# Patient Record
Sex: MALE | Race: WHITE | NOT HISPANIC OR LATINO | URBAN - METROPOLITAN AREA
[De-identification: names, ages, dates, MRNs, and addresses within clinical notes are randomized per-mention and may not be internally consistent; named-entity substitution may affect disease eponyms.]

---

## 2019-04-18 RX ORDER — TRIAMCINOLONE ACETONIDE 1 MG/G
CREAM TOPICAL 2 TIMES DAILY
COMMUNITY
End: 2020-11-19

## 2019-04-18 RX ORDER — DIMENHYDRINATE 50 MG
TABLET ORAL
COMMUNITY

## 2019-04-18 RX ORDER — CLOBETASOL PROPIONATE 0.5 MG/G
OINTMENT TOPICAL 2 TIMES DAILY
COMMUNITY
End: 2020-11-19

## 2019-04-18 RX ORDER — OMEGA-3/DHA/EPA/FISH OIL 60 MG-90MG
CAPSULE ORAL
COMMUNITY

## 2019-05-03 ENCOUNTER — OFFICE VISIT (OUTPATIENT)
Dept: NEUROSURGERY | Facility: CLINIC | Age: 54
End: 2019-05-03

## 2019-05-03 VITALS — HEIGHT: 64 IN | BODY MASS INDEX: 30.9 KG/M2 | TEMPERATURE: 98.9 F | WEIGHT: 181 LBS

## 2019-05-03 DIAGNOSIS — M51.36 DDD (DEGENERATIVE DISC DISEASE), LUMBAR: ICD-10-CM

## 2019-05-03 DIAGNOSIS — M54.59 MECHANICAL LOW BACK PAIN: Primary | ICD-10-CM

## 2019-05-03 DIAGNOSIS — M51.36 BULGING LUMBAR DISC: ICD-10-CM

## 2019-05-03 PROBLEM — M51.369 DDD (DEGENERATIVE DISC DISEASE), LUMBAR: Status: ACTIVE | Noted: 2019-05-03

## 2019-05-03 PROBLEM — M51.369 BULGING LUMBAR DISC: Status: ACTIVE | Noted: 2019-05-03

## 2019-05-03 PROCEDURE — 99243 OFF/OP CNSLTJ NEW/EST LOW 30: CPT | Performed by: NEUROLOGICAL SURGERY

## 2019-05-03 RX ORDER — METHOCARBAMOL 750 MG/1
750 TABLET, FILM COATED ORAL 3 TIMES DAILY
COMMUNITY
End: 2020-11-19

## 2019-05-03 NOTE — PROGRESS NOTES
Patient: Alberto Fernandze  : 1965    Primary Care Provider: Provider, No Known    Requesting Provider: As above        History    Chief Complaint: Chronic low back pain.    History of Present Illness: Mr. Fernandez is a 54-year-old  who first developed back pain when he injured his back in the [employer] in .  He had a severe flareup in  with sneezing.  His pain has essentially been ongoing.  He has no leg pain, weakness, numbness.  He has stiffness and difficulty moving about with some of the work that he does.  He has no bowel or bladder dysfunction.  In the past physical therapy was not helpful.  He has done some chiropractic.  He has never had injections.  He is better when not working.  Certain positions or lifting can be aggravating.    Review of Systems   Constitutional: Negative for activity change, appetite change, chills, diaphoresis, fatigue, fever and unexpected weight change.   HENT: Negative for congestion, dental problem, drooling, ear discharge, ear pain, facial swelling, hearing loss, mouth sores, nosebleeds, postnasal drip, rhinorrhea, sinus pressure, sneezing, sore throat, tinnitus, trouble swallowing and voice change.    Eyes: Negative for photophobia, pain, discharge, redness, itching and visual disturbance.   Respiratory: Negative for apnea, cough, choking, chest tightness, shortness of breath, wheezing and stridor.    Cardiovascular: Negative for chest pain, palpitations and leg swelling.   Gastrointestinal: Negative for abdominal distention, abdominal pain, anal bleeding, blood in stool, constipation, diarrhea, nausea, rectal pain and vomiting.   Endocrine: Negative for cold intolerance, heat intolerance, polydipsia, polyphagia and polyuria.   Genitourinary: Negative for decreased urine volume, difficulty urinating, dysuria, enuresis, flank pain, frequency, genital sores, hematuria and urgency.   Musculoskeletal: Positive for back pain. Negative for arthralgias, gait  "problem, joint swelling, myalgias, neck pain and neck stiffness.   Skin: Negative for color change, pallor, rash and wound.   Allergic/Immunologic: Negative for environmental allergies, food allergies and immunocompromised state.   Neurological: Negative for dizziness, tremors, seizures, syncope, facial asymmetry, speech difficulty, weakness, light-headedness, numbness and headaches.   Hematological: Negative for adenopathy. Does not bruise/bleed easily.   Psychiatric/Behavioral: Negative for agitation, behavioral problems, confusion, decreased concentration, dysphoric mood, hallucinations, self-injury, sleep disturbance and suicidal ideas. The patient is not nervous/anxious and is not hyperactive.        The patient's past medical history, past surgical history, family history, and social history have been reviewed at length in the electronic medical record.    Physical Exam:   Temp 98.9 °F (37.2 °C) (Temporal)   Ht 162.6 cm (64\")   Wt 82.1 kg (181 lb)   BMI 31.07 kg/m²   CONSTITUTIONAL: Patient is well-nourished, pleasant and appears stated age.  CV: Heart regular rate and rhythm without murmur, rub, or gallop.  PULMONARY: Lungs are clear to ascultation.  MUSCULOSKELETAL:  Straight leg raising is negative.  Kenny's Sign is negative.  ROM in back normal.  Tenderness in the back to palpation is not observed.  NEUROLOGICAL:  Orientation, memory, attention span, language function, and cognition have been examined and are intact.  Strength is intact in the lower extremities to direct testing.  Muscle tone is normal throughout.  Station and gait are normal.  Sensation is intact to light touch testing throughout.  Deep tendon reflexes are 1+ and symmetrical.  Coordination is intact.      Medical Decision Making    Data Review:   MRI of the lumbar spine dated 1/21/2019 demonstrates some diffuse degenerative disc disease.  There is also facet arthropathy.  There is disc protrusion into the recess on the right at L4-5 " and some similar findings more leftward at L5-S1.    Diagnosis:   The patient has mechanical back pain due to degenerative disc and degenerative joint disease.  He does not have an active radiculopathy despite having some disc protrusions.    Treatment Options:   There is no role for surgical intervention in this setting.  I have offered to refer him for some injections but he would like to wait on that.  He does not feel that his symptoms are severe enough at this point to warrant injections.  He will follow-up with me on an as-needed basis.       Diagnosis Plan   1. Mechanical low back pain     2. Bulging lumbar disc      L4/5, L5/S1   3. DDD (degenerative disc disease), lumbar         Scribed for Benji Rocha MD by Kendra Dorsey CMA. 5/3/2019 3:21 PM    I, Dr. Rocha, personally performed the services described in the documentation, as scribed in my presence, and it is both accurate and complete.

## 2020-11-19 ENCOUNTER — OFFICE VISIT (OUTPATIENT)
Dept: FAMILY MEDICINE CLINIC | Facility: CLINIC | Age: 55
End: 2020-11-19

## 2020-11-19 VITALS
WEIGHT: 188 LBS | OXYGEN SATURATION: 98 % | RESPIRATION RATE: 16 BRPM | TEMPERATURE: 99.3 F | SYSTOLIC BLOOD PRESSURE: 160 MMHG | HEIGHT: 64 IN | HEART RATE: 78 BPM | BODY MASS INDEX: 32.1 KG/M2 | DIASTOLIC BLOOD PRESSURE: 88 MMHG

## 2020-11-19 DIAGNOSIS — L30.9 ECZEMA OF BOTH HANDS: ICD-10-CM

## 2020-11-19 DIAGNOSIS — Z11.59 ENCOUNTER FOR HEPATITIS C SCREENING TEST FOR LOW RISK PATIENT: ICD-10-CM

## 2020-11-19 DIAGNOSIS — E78.00 HIGH CHOLESTEROL: ICD-10-CM

## 2020-11-19 DIAGNOSIS — Q55.61 PENILE CURVE: Primary | ICD-10-CM

## 2020-11-19 PROBLEM — I10 ESSENTIAL HYPERTENSION: Status: ACTIVE | Noted: 2020-11-19

## 2020-11-19 PROCEDURE — 99203 OFFICE O/P NEW LOW 30 MIN: CPT | Performed by: FAMILY MEDICINE

## 2020-11-19 NOTE — PROGRESS NOTES
Subjective   Alberto Fernandez is a 55 y.o. male.   Chief Complaint   Patient presents with   • Establish Care   • Discuss ED     penis is bent, sex uncomfortable     History of Present Illness   2 year ago, penis bent during intercourse. It occurred with penetration, it only hurt immediately after, but quickly resolved.   Since then, when penis is erect, it is bent. Seems to be getting progressively worse. He has no pain associated with this, but states that sexual intercourse is uncomfortable for his wife due to this.  No abnormality with flaccid penis, only when erect.     [facility]   They are seeing him for his back pain, diagnosed with muscle strain in 1986.     History of high cholesterol. Has been a few months since he updated labs.   Also, has hypertension, but monitors periodically at home and BP <140/90.     Hands are dry, worse during the winter months. He also is exposed to multiple chemicals at work, which also dries his hands. Requesting paraffin wax treatment to moisturize hands.     The following portions of the patient's history were reviewed and updated as appropriate: allergies, current medications, past family history, past medical history, past social history, past surgical history and problem list.    Review of Systems   Constitutional: Negative for activity change, appetite change and fever.   HENT: Negative.    Eyes: Negative.    Respiratory: Negative for cough and shortness of breath.    Cardiovascular: Negative for chest pain.   Genitourinary: Negative for discharge, penile pain and erectile dysfunction.        Penile curvature   Skin: Positive for dry skin (hands).   Neurological: Negative for light-headedness and headache.   Hematological: Negative for adenopathy.   Psychiatric/Behavioral: Negative.        Objective   Physical Exam  Vitals signs and nursing note reviewed.   Constitutional:       Appearance: He is well-developed.   HENT:      Head: Normocephalic and atraumatic.       Right Ear: Tympanic membrane, ear canal and external ear normal.      Left Ear: Tympanic membrane, ear canal and external ear normal.      Nose: Nose normal.   Eyes:      Conjunctiva/sclera: Conjunctivae normal.   Neck:      Musculoskeletal: Normal range of motion and neck supple.   Cardiovascular:      Rate and Rhythm: Normal rate and regular rhythm.      Heart sounds: Normal heart sounds.   Pulmonary:      Effort: Pulmonary effort is normal.      Breath sounds: Normal breath sounds. No wheezing.   Abdominal:      General: Bowel sounds are normal.      Palpations: Abdomen is soft.   Musculoskeletal:         General: No deformity.   Skin:     General: Skin is warm and dry.      Comments: Dry, flaky hands bilaterally.   Neurological:      Mental Status: He is alert and oriented to person, place, and time.      Cranial Nerves: No cranial nerve deficit.   Psychiatric:         Behavior: Behavior normal.           Assessment/Plan   Diagnoses and all orders for this visit:    1. Penile curve (Primary)  Comments:  Will refer to urology for further treatment  Orders:  -     Ambulatory Referral to Urology  -     CBC & Differential; Future  -     Comprehensive Metabolic Panel; Future  -     TSH+Free T4; Future    2. High cholesterol  Comments:  will update labs in near future  Orders:  -     Lipid Panel With / Chol / HDL Ratio; Future  -     TSH+Free T4; Future    3. Eczema of both hands  -     Emollient (WaxWel Paraffin Bath) kit; Apply 1 Units topically As Needed (dry hands). Use as directed to treat dry hands  Dispense: 1 kit; Refill: 0  -     Paraffin wax; Use as directed for treatment of dry hands  Dispense: 454 g; Refill: 3    4. Encounter for hepatitis C screening test for low risk patient  -     Hepatitis C Antibody; Future      BP is elevated, but he states that he monitors at home and while [facility], normal there.  Continue monitoring.

## 2020-11-19 NOTE — PATIENT INSTRUCTIONS
Go to the nearest ER or return to clinic if symptoms worsen, fever/chill develop    Hypertension, Adult  Hypertension is another name for high blood pressure. High blood pressure forces your heart to work harder to pump blood. This can cause problems over time.  There are two numbers in a blood pressure reading. There is a top number (systolic) over a bottom number (diastolic). It is best to have a blood pressure that is below 120/80. Healthy choices can help lower your blood pressure, or you may need medicine to help lower it.  What are the causes?  The cause of this condition is not known. Some conditions may be related to high blood pressure.  What increases the risk?  · Smoking.  · Having type 2 diabetes mellitus, high cholesterol, or both.  · Not getting enough exercise or physical activity.  · Being overweight.  · Having too much fat, sugar, calories, or salt (sodium) in your diet.  · Drinking too much alcohol.  · Having long-term (chronic) kidney disease.  · Having a family history of high blood pressure.  · Age. Risk increases with age.  · Race. You may be at higher risk if you are .  · Gender. Men are at higher risk than women before age 45. After age 65, women are at higher risk than men.  · Having obstructive sleep apnea.  · Stress.  What are the signs or symptoms?  · High blood pressure may not cause symptoms. Very high blood pressure (hypertensive crisis) may cause:  ? Headache.  ? Feelings of worry or nervousness (anxiety).  ? Shortness of breath.  ? Nosebleed.  ? A feeling of being sick to your stomach (nausea).  ? Throwing up (vomiting).  ? Changes in how you see.  ? Very bad chest pain.  ? Seizures.  How is this treated?  · This condition is treated by making healthy lifestyle changes, such as:  ? Eating healthy foods.  ? Exercising more.  ? Drinking less alcohol.  · Your health care provider may prescribe medicine if lifestyle changes are not enough to get your blood pressure under  control, and if:  ? Your top number is above 130.  ? Your bottom number is above 80.  · Your personal target blood pressure may vary.  Follow these instructions at home:  Eating and drinking    · If told, follow the DASH eating plan. To follow this plan:  ? Fill one half of your plate at each meal with fruits and vegetables.  ? Fill one fourth of your plate at each meal with whole grains. Whole grains include whole-wheat pasta, brown rice, and whole-grain bread.  ? Eat or drink low-fat dairy products, such as skim milk or low-fat yogurt.  ? Fill one fourth of your plate at each meal with low-fat (lean) proteins. Low-fat proteins include fish, chicken without skin, eggs, beans, and tofu.  ? Avoid fatty meat, cured and processed meat, or chicken with skin.  ? Avoid pre-made or processed food.  · Eat less than 1,500 mg of salt each day.  · Do not drink alcohol if:  ? Your doctor tells you not to drink.  ? You are pregnant, may be pregnant, or are planning to become pregnant.  · If you drink alcohol:  ? Limit how much you use to:  § 0-1 drink a day for women.  § 0-2 drinks a day for men.  ? Be aware of how much alcohol is in your drink. In the [place] one drink equals one 12 oz bottle of beer (355 mL), one 5 oz glass of wine (148 mL), or one 1½ oz glass of hard liquor (44 mL).  Lifestyle    · Work with your doctor to stay at a healthy weight or to lose weight. Ask your doctor what the best weight is for you.  · Get at least 30 minutes of exercise most days of the week. This may include walking, swimming, or biking.  · Get at least 30 minutes of exercise that strengthens your muscles (resistance exercise) at least 3 days a week. This may include lifting weights or doing Pilates.  · Do not use any products that contain nicotine or tobacco, such as cigarettes, e-cigarettes, and chewing tobacco. If you need help quitting, ask your doctor.  · Check your blood pressure at home as told by your doctor.  · Keep all follow-up  visits as told by your doctor. This is important.  Medicines  · Take over-the-counter and prescription medicines only as told by your doctor. Follow directions carefully.  · Do not skip doses of blood pressure medicine. The medicine does not work as well if you skip doses. Skipping doses also puts you at risk for problems.  · Ask your doctor about side effects or reactions to medicines that you should watch for.  Contact a doctor if you:  · Think you are having a reaction to the medicine you are taking.  · Have headaches that keep coming back (recurring).  · Feel dizzy.  · Have swelling in your ankles.  · Have trouble with your vision.  Get help right away if you:  · Get a very bad headache.  · Start to feel mixed up (confused).  · Feel weak or numb.  · Feel faint.  · Have very bad pain in your:  ? Chest.  ? Belly (abdomen).  · Throw up more than once.  · Have trouble breathing.  Summary  · Hypertension is another name for high blood pressure.  · High blood pressure forces your heart to work harder to pump blood.  · For most people, a normal blood pressure is less than 120/80.  · Making healthy choices can help lower blood pressure. If your blood pressure does not get lower with healthy choices, you may need to take medicine.  This information is not intended to replace advice given to you by your health care provider. Make sure you discuss any questions you have with your health care provider.  Document Released: 06/05/2009 Document Revised: 08/28/2019 Document Reviewed: 08/28/2019  [employer] Patient Education © 2020 [employer]

## 2020-12-19 ENCOUNTER — RESULTS ENCOUNTER (OUTPATIENT)
Dept: FAMILY MEDICINE CLINIC | Facility: CLINIC | Age: 55
End: 2020-12-19

## 2020-12-19 DIAGNOSIS — Q55.61 PENILE CURVE: ICD-10-CM

## 2020-12-19 DIAGNOSIS — E78.00 HIGH CHOLESTEROL: ICD-10-CM

## 2020-12-19 DIAGNOSIS — Z11.59 ENCOUNTER FOR HEPATITIS C SCREENING TEST FOR LOW RISK PATIENT: ICD-10-CM

## 2021-01-22 ENCOUNTER — LAB (OUTPATIENT)
Dept: FAMILY MEDICINE CLINIC | Facility: CLINIC | Age: 56
End: 2021-01-22

## 2021-01-23 LAB
ALBUMIN SERPL-MCNC: 4.5 G/DL (ref 3.5–5.2)
ALBUMIN/GLOB SERPL: 2.1 G/DL
ALP SERPL-CCNC: 77 U/L (ref 39–117)
ALT SERPL-CCNC: 53 U/L (ref 1–41)
AST SERPL-CCNC: 29 U/L (ref 1–40)
BASOPHILS # BLD AUTO: 0.04 10*3/MM3 (ref 0–0.2)
BASOPHILS NFR BLD AUTO: 0.6 % (ref 0–1.5)
BILIRUB SERPL-MCNC: 0.4 MG/DL (ref 0–1.2)
BUN SERPL-MCNC: 19 MG/DL (ref 6–20)
BUN/CREAT SERPL: 20 (ref 7–25)
CALCIUM SERPL-MCNC: 9.1 MG/DL (ref 8.6–10.5)
CHLORIDE SERPL-SCNC: 106 MMOL/L (ref 98–107)
CHOLEST SERPL-MCNC: 238 MG/DL (ref 0–200)
CHOLEST/HDLC SERPL: 6.26 {RATIO}
CO2 SERPL-SCNC: 27.5 MMOL/L (ref 22–29)
CREAT SERPL-MCNC: 0.95 MG/DL (ref 0.76–1.27)
EOSINOPHIL # BLD AUTO: 0.25 10*3/MM3 (ref 0–0.4)
EOSINOPHIL NFR BLD AUTO: 3.9 % (ref 0.3–6.2)
ERYTHROCYTE [DISTWIDTH] IN BLOOD BY AUTOMATED COUNT: 13.4 % (ref 12.3–15.4)
GLOBULIN SER CALC-MCNC: 2.1 GM/DL
GLUCOSE SERPL-MCNC: 94 MG/DL (ref 65–99)
HCT VFR BLD AUTO: 45.7 % (ref 37.5–51)
HCV AB S/CO SERPL IA: <0.1 S/CO RATIO (ref 0–0.9)
HDLC SERPL-MCNC: 38 MG/DL (ref 40–60)
HGB BLD-MCNC: 15.2 G/DL (ref 13–17.7)
IMM GRANULOCYTES # BLD AUTO: 0.02 10*3/MM3 (ref 0–0.05)
IMM GRANULOCYTES NFR BLD AUTO: 0.3 % (ref 0–0.5)
LDLC SERPL CALC-MCNC: 177 MG/DL (ref 0–100)
LYMPHOCYTES # BLD AUTO: 1.8 10*3/MM3 (ref 0.7–3.1)
LYMPHOCYTES NFR BLD AUTO: 28.4 % (ref 19.6–45.3)
MCH RBC QN AUTO: 28.8 PG (ref 26.6–33)
MCHC RBC AUTO-ENTMCNC: 33.3 G/DL (ref 31.5–35.7)
MCV RBC AUTO: 86.6 FL (ref 79–97)
MONOCYTES # BLD AUTO: 0.59 10*3/MM3 (ref 0.1–0.9)
MONOCYTES NFR BLD AUTO: 9.3 % (ref 5–12)
NEUTROPHILS # BLD AUTO: 3.63 10*3/MM3 (ref 1.7–7)
NEUTROPHILS NFR BLD AUTO: 57.5 % (ref 42.7–76)
NRBC BLD AUTO-RTO: 0 /100 WBC (ref 0–0.2)
PLATELET # BLD AUTO: 225 10*3/MM3 (ref 140–450)
POTASSIUM SERPL-SCNC: 5 MMOL/L (ref 3.5–5.2)
PROT SERPL-MCNC: 6.6 G/DL (ref 6–8.5)
RBC # BLD AUTO: 5.28 10*6/MM3 (ref 4.14–5.8)
SODIUM SERPL-SCNC: 142 MMOL/L (ref 136–145)
T4 FREE SERPL-MCNC: 1 NG/DL (ref 0.93–1.7)
TRIGL SERPL-MCNC: 128 MG/DL (ref 0–150)
TSH SERPL DL<=0.005 MIU/L-ACNC: 1.31 UIU/ML (ref 0.27–4.2)
VLDLC SERPL CALC-MCNC: 23 MG/DL (ref 5–40)
WBC # BLD AUTO: 6.33 10*3/MM3 (ref 3.4–10.8)

## 2021-05-18 ENCOUNTER — OFFICE VISIT (OUTPATIENT)
Dept: FAMILY MEDICINE CLINIC | Facility: CLINIC | Age: 56
End: 2021-05-18

## 2021-05-18 VITALS
DIASTOLIC BLOOD PRESSURE: 86 MMHG | HEART RATE: 82 BPM | BODY MASS INDEX: 32.1 KG/M2 | WEIGHT: 188 LBS | HEIGHT: 64 IN | SYSTOLIC BLOOD PRESSURE: 144 MMHG | RESPIRATION RATE: 18 BRPM | OXYGEN SATURATION: 99 % | TEMPERATURE: 98.2 F

## 2021-05-18 DIAGNOSIS — M54.50 CHRONIC MIDLINE LOW BACK PAIN WITHOUT SCIATICA: Primary | ICD-10-CM

## 2021-05-18 DIAGNOSIS — G89.29 CHRONIC MIDLINE LOW BACK PAIN WITHOUT SCIATICA: Primary | ICD-10-CM

## 2021-05-18 PROCEDURE — 99213 OFFICE O/P EST LOW 20 MIN: CPT | Performed by: NURSE PRACTITIONER

## 2021-05-18 RX ORDER — BACLOFEN 10 MG/1
10 TABLET ORAL 3 TIMES DAILY
Qty: 60 TABLET | Refills: 0 | Status: SHIPPED | OUTPATIENT
Start: 2021-05-18 | End: 2021-10-28

## 2021-05-18 NOTE — PROGRESS NOTES
"Chief Complaint  Lower back pain x 5d (Sharp and achy pain, constant )    Subjective          Alberto Fernandez presents to [facility]  History of Present Illness  Low back pain  Left sided, no sciatica or weakness or numbness in lower extremity  Lying and sitting and standing all makes pain worse. Certain movements make it very painful.   Rates pain 8/10 today.   Bulging disc on XR   Using heating pad helps but as soon as he stops the pain comes back like a spasm  Has been taking Ibuprofen and Tylenol OTC for pain.  Has done PT in the past  Injury to back many years ago when in       Objective   Vital Signs:   /86   Pulse 82   Temp 98.2 °F (36.8 °C)   Resp 18   Ht 162.6 cm (64\")   Wt 85.3 kg (188 lb)   SpO2 99%   BMI 32.27 kg/m²     Physical Exam  Vitals and nursing note reviewed.   Constitutional:       General: He is in acute distress.      Appearance: Normal appearance. He is well-developed.   HENT:      Head: Normocephalic.   Eyes:      General: Lids are normal.   Cardiovascular:      Rate and Rhythm: Normal rate and regular rhythm.      Heart sounds: Normal heart sounds, S1 normal and S2 normal.   Pulmonary:      Effort: Pulmonary effort is normal.      Breath sounds: Normal breath sounds.   Musculoskeletal:         General: Tenderness present. No deformity.      Cervical back: Normal range of motion and neck supple.      Lumbar back: Spasms, tenderness and bony tenderness present. Decreased range of motion.   Skin:     General: Skin is warm and dry.   Neurological:      Mental Status: He is alert and oriented to person, place, and time.      Sensory: No sensory deficit.      Motor: No abnormal muscle tone.      Deep Tendon Reflexes: Reflexes normal.      Comments: Difficulty getting up from seated position due to low back pain   Psychiatric:         Speech: Speech normal.         Behavior: Behavior normal.         Thought Content: Thought content normal.    "      Judgment: Judgment normal.        Result Review :                 Assessment and Plan    Diagnoses and all orders for this visit:    1. Chronic midline low back pain without sciatica (Primary)  -     baclofen (LIORESAL) 10 MG tablet; Take 1 tablet by mouth 3 (Three) Times a Day.  Dispense: 60 tablet; Refill: 0  -     Ambulatory Referral to Physical Therapy Evaluate and treat (low back pain)        Follow Up   Return if symptoms worsen or fail to improve.  Patient was given instructions and counseling regarding his condition or for health maintenance advice. Please see specific information pulled into the AVS if appropriate.   Will prescribe some muscle relaxants Baclofen 10 mg 3 times a day prn and have pt continue with OTC Ibuprofen 600-800 mg 3 times a day, Tylenol 500 mg every 8 hours.  Gentle stretching and heat/ice.  Referral to PT for evaluation and treatment given.  F/U if not improving, discussed doing topical pain reliever from [facility]. Pt will call back if not improving.

## 2021-09-17 DIAGNOSIS — Q55.61 PENILE CURVE: Primary | ICD-10-CM

## 2021-10-15 ENCOUNTER — OFFICE VISIT (OUTPATIENT)
Dept: FAMILY MEDICINE CLINIC | Facility: CLINIC | Age: 56
End: 2021-10-15

## 2021-10-15 VITALS
OXYGEN SATURATION: 99 % | TEMPERATURE: 98.2 F | HEART RATE: 70 BPM | DIASTOLIC BLOOD PRESSURE: 64 MMHG | HEIGHT: 64 IN | SYSTOLIC BLOOD PRESSURE: 118 MMHG | WEIGHT: 190.2 LBS | BODY MASS INDEX: 32.47 KG/M2

## 2021-10-15 DIAGNOSIS — R05.9 COUGH: ICD-10-CM

## 2021-10-15 DIAGNOSIS — J06.9 ACUTE URI: Primary | ICD-10-CM

## 2021-10-15 PROCEDURE — 99213 OFFICE O/P EST LOW 20 MIN: CPT | Performed by: FAMILY MEDICINE

## 2021-10-15 RX ORDER — AZITHROMYCIN 250 MG/1
TABLET, FILM COATED ORAL
Qty: 6 TABLET | Refills: 0 | Status: SHIPPED | OUTPATIENT
Start: 2021-10-15 | End: 2021-10-28

## 2021-10-15 RX ORDER — BROMPHENIRAMINE MALEATE, PSEUDOEPHEDRINE HYDROCHLORIDE, AND DEXTROMETHORPHAN HYDROBROMIDE 2; 30; 10 MG/5ML; MG/5ML; MG/5ML
5 SYRUP ORAL 4 TIMES DAILY PRN
Qty: 180 ML | Refills: 0 | Status: SHIPPED | OUTPATIENT
Start: 2021-10-15 | End: 2021-10-28 | Stop reason: SDUPTHER

## 2021-10-15 NOTE — PROGRESS NOTES
Subjective   Alberto Fernandez is a 56 y.o. male.     History of Present Illness     Cough times 2 weeks  Just no getting better  No fever   no SOA noted    Had COVID vaccine, has not had a COVID test          Review of Systems   Constitutional: Negative for fever.   HENT: Positive for congestion.    Respiratory: Positive for cough.        Objective   Physical Exam  Vitals and nursing note reviewed.   Constitutional:       Appearance: He is well-developed.   HENT:      Head: Normocephalic and atraumatic.      Right Ear: Hearing, tympanic membrane, ear canal and external ear normal.      Left Ear: Hearing, tympanic membrane, ear canal and external ear normal.      Nose: Nose normal.      Mouth/Throat:      Pharynx: Uvula midline.   Eyes:      Conjunctiva/sclera: Conjunctivae normal.   Cardiovascular:      Rate and Rhythm: Normal rate and regular rhythm.      Heart sounds: Normal heart sounds.   Pulmonary:      Effort: Pulmonary effort is normal.      Breath sounds: Normal breath sounds.   Musculoskeletal:      Cervical back: Normal range of motion.   Lymphadenopathy:      Cervical: No cervical adenopathy.   Psychiatric:         Behavior: Behavior normal.         Assessment/Plan   Diagnoses and all orders for this visit:    1. Acute URI (Primary)  -     azithromycin (Zithromax) 250 MG tablet; Take 2 tablets the first day, then 1 tablet daily for 4 days.  Dispense: 6 tablet; Refill: 0  -     brompheniramine-pseudoephedrine-DM 30-2-10 MG/5ML syrup; Take 5 mL by mouth 4 (Four) Times a Day As Needed for Congestion or Cough.  Dispense: 180 mL; Refill: 0    2. Cough  -     COVID-19,[facility] ROUTINE, NP/OP SWAB IN TRANSPORT MEDIA OR ESWAB 72 HR TAT - Swab, Nasopharynx    zpac, bromfed, COVID test sent off.  F/u pending results

## 2021-10-16 LAB
LABCORP SARS-COV-2, NAA 2 DAY TAT: NORMAL
SARS-COV-2 RNA RESP QL NAA+PROBE: NOT DETECTED

## 2021-10-28 ENCOUNTER — OFFICE VISIT (OUTPATIENT)
Dept: FAMILY MEDICINE CLINIC | Facility: CLINIC | Age: 56
End: 2021-10-28

## 2021-10-28 VITALS
DIASTOLIC BLOOD PRESSURE: 80 MMHG | HEIGHT: 64 IN | TEMPERATURE: 98 F | RESPIRATION RATE: 24 BRPM | SYSTOLIC BLOOD PRESSURE: 130 MMHG | WEIGHT: 187.6 LBS | OXYGEN SATURATION: 96 % | BODY MASS INDEX: 32.03 KG/M2 | HEART RATE: 74 BPM

## 2021-10-28 DIAGNOSIS — J06.9 ACUTE URI: ICD-10-CM

## 2021-10-28 PROCEDURE — 99213 OFFICE O/P EST LOW 20 MIN: CPT | Performed by: NURSE PRACTITIONER

## 2021-10-28 RX ORDER — LEVOCETIRIZINE DIHYDROCHLORIDE 5 MG/1
5 TABLET, FILM COATED ORAL EVERY EVENING
COMMUNITY

## 2021-10-28 RX ORDER — BROMPHENIRAMINE MALEATE, PSEUDOEPHEDRINE HYDROCHLORIDE, AND DEXTROMETHORPHAN HYDROBROMIDE 2; 30; 10 MG/5ML; MG/5ML; MG/5ML
5 SYRUP ORAL 4 TIMES DAILY PRN
Qty: 180 ML | Refills: 0 | Status: SHIPPED | OUTPATIENT
Start: 2021-10-28 | End: 2022-06-27

## 2021-10-28 RX ORDER — ALBUTEROL SULFATE 90 UG/1
2 AEROSOL, METERED RESPIRATORY (INHALATION) EVERY 4 HOURS PRN
Qty: 18 G | Refills: 0 | Status: SHIPPED | OUTPATIENT
Start: 2021-10-28

## 2021-10-28 RX ORDER — PREDNISONE 20 MG/1
TABLET ORAL
Qty: 10 TABLET | Refills: 0 | Status: SHIPPED | OUTPATIENT
Start: 2021-10-28

## 2021-10-28 NOTE — PROGRESS NOTES
"Chief Complaint  FU on URI / still coughing    Subjective          Alberto Fernandez presents to [facility]  History of Present Illness  Cough and congestion, tightness in chest, wheezing worse at night when lays down.  Was seen by Dr Adams on 10/15/21 and still has not recovered completely  No fever or chills, no congestion in nose, no loss of taste or smell, some runny nose  No hx of pneumonia or asthma  Clear sputum. Taking cough syrup Bromfed has helped some  No chest pain or difficulty breathing. No dizziness or night sweats.    Objective   Vital Signs:   /80   Pulse 74   Temp 98 °F (36.7 °C)   Resp 24   Ht 162.6 cm (64.02\")   Wt 85.1 kg (187 lb 9.6 oz)   SpO2 96%   BMI 32.19 kg/m²     Physical Exam  Constitutional:       General: He is not in acute distress.     Appearance: Normal appearance. He is not ill-appearing.   HENT:      Head: Normocephalic and atraumatic.      Right Ear: Tympanic membrane, ear canal and external ear normal.      Left Ear: Tympanic membrane, ear canal and external ear normal.   Cardiovascular:      Rate and Rhythm: Normal rate and regular rhythm.      Pulses: Normal pulses.      Heart sounds: Normal heart sounds.   Pulmonary:      Effort: Pulmonary effort is normal.      Breath sounds: Normal breath sounds. No wheezing, rhonchi or rales.   Lymphadenopathy:      Cervical: No cervical adenopathy.   Skin:     General: Skin is warm and dry.      Capillary Refill: Capillary refill takes less than 2 seconds.   Neurological:      General: No focal deficit present.      Mental Status: He is alert and oriented to person, place, and time.   Psychiatric:         Behavior: Behavior normal.         Thought Content: Thought content normal.         Judgment: Judgment normal.        Result Review :                 Assessment and Plan    Diagnoses and all orders for this visit:    1. Acute URI  -     brompheniramine-pseudoephedrine-DM 30-2-10 MG/5ML syrup; " Take 5 mL by mouth 4 (Four) Times a Day As Needed for Congestion or Cough.  Dispense: 180 mL; Refill: 0    Other orders  -     albuterol sulfate  (90 Base) MCG/ACT inhaler; Inhale 2 puffs Every 4 (Four) Hours As Needed for Wheezing.  Dispense: 18 g; Refill: 0  -     predniSONE (DELTASONE) 20 MG tablet; 1 tablet twice a day  Dispense: 10 tablet; Refill: 0        Follow Up   No follow-ups on file.  Patient was given instructions and counseling regarding his condition or for health maintenance advice. Please see specific information pulled into the AVS if appropriate.     Will treat with some steroids and inhaler and refill cough medication   F/U if not better will need to check XR of chest

## 2021-11-05 ENCOUNTER — OFFICE VISIT (OUTPATIENT)
Dept: UROLOGY | Facility: CLINIC | Age: 56
End: 2021-11-05

## 2021-11-05 VITALS
HEIGHT: 64 IN | BODY MASS INDEX: 31.92 KG/M2 | SYSTOLIC BLOOD PRESSURE: 138 MMHG | WEIGHT: 187 LBS | OXYGEN SATURATION: 96 % | HEART RATE: 104 BPM | DIASTOLIC BLOOD PRESSURE: 88 MMHG

## 2021-11-05 DIAGNOSIS — N48.6 PEYRONIE'S DISEASE: Primary | ICD-10-CM

## 2021-11-05 PROCEDURE — 99204 OFFICE O/P NEW MOD 45 MIN: CPT | Performed by: UROLOGY

## 2021-11-05 NOTE — PROGRESS NOTES
Office Visit New Urology      Patient Name: Alberto Fernandez  : 1965   MRN: [id number]     Chief Complaint:    Chief Complaint   Patient presents with   • Curved Penis       Referring Provider: Albina Worthy DO    History of Present Illness: Alberto Fernandez is a 56 y.o. male who presents to Urology today for evaluation of curvature with erection.  Patient has a past medical history significant for hypertension, low back pain, degenerative disc disease.  He reports an episode of penile trauma with penetration approximately 1 year ago.  He reports a buckling injury associated with immediate pain.  Denies detumescence at that time.  He reports no significant issues following or issues with erectile dysfunction.  He does report that over the past 6 to 9 months he has developed penile dorsal curvature.  He denies difficulty achieving or maintaining an erection.  He denies pain with erection.  He does report dorsal curvature that is resulting in pain during intercourse for his partner.  He denies that the curvature is significant enough to prevent penetration.  Otherwise he denies lower urinary tract symptoms.  Denies history of urinary tract infection, hematuria.  Denies prior urologic evaluation or instrumentation.      Subjective      Review of System: Review of Systems   Constitutional: Negative for chills, fatigue, fever and unexpected weight change.   HENT: Negative for sore throat.    Eyes: Negative for visual disturbance.   Respiratory: Negative for cough, chest tightness and shortness of breath.    Cardiovascular: Negative for chest pain and leg swelling.   Gastrointestinal: Negative for blood in stool, constipation, diarrhea, nausea, rectal pain and vomiting.   Genitourinary: Negative for decreased urine volume, difficulty urinating, dysuria, enuresis, flank pain, frequency, genital sores, hematuria and urgency.   Musculoskeletal: Negative for back pain and joint swelling.   Skin: Negative  for rash and wound.   Neurological: Negative for seizures, speech difficulty, weakness and headaches.   Psychiatric/Behavioral: Negative for confusion, sleep disturbance and suicidal ideas. The patient is not nervous/anxious.       I have reviewed the ROS documented by my clinical staff, updated appropriately and I agree. Cl Plunkett MD    Past Medical History:   Past Medical History:   Diagnosis Date   • Eczema    • Hyperlipidemia        Past Surgical History:   Past Surgical History:   Procedure Laterality Date   • HERNIA REPAIR     • VASECTOMY  October 2015       Family History:   Family History   Problem Relation Age of Onset   • Diabetes Mother    • Cancer Father    • Cancer Brother        Social History:   Social History     Socioeconomic History   • Marital status:    Tobacco Use   • Smoking status: Former Smoker     Packs/day: 0.25     Years: 15.00     Pack years: 3.75     Types: Cigarettes   • Smokeless tobacco: Never Used   Substance and Sexual Activity   • Alcohol use: Not Currently     Alcohol/week: 0.0 standard drinks   • Drug use: Never   • Sexual activity: Defer       Medications:     Current Outpatient Medications:   •  albuterol sulfate  (90 Base) MCG/ACT inhaler, Inhale 2 puffs Every 4 (Four) Hours As Needed for Wheezing., Disp: 18 g, Rfl: 0  •  brompheniramine-pseudoephedrine-DM 30-2-10 MG/5ML syrup, Take 5 mL by mouth 4 (Four) Times a Day As Needed for Congestion or Cough., Disp: 180 mL, Rfl: 0  •  Emollient (WaxWel Paraffin Bath) kit, Apply 1 Units topically As Needed (dry hands). Use as directed to treat dry hands, Disp: 1 kit, Rfl: 0  •  Flaxseed, Linseed, (FLAX SEED OIL) 1000 MG capsule, Take  by mouth., Disp: , Rfl:   •  levocetirizine (XYZAL) 5 MG tablet, Take 5 mg by mouth Every Evening., Disp: , Rfl:   •  Omega-3 Fatty Acids (FISH OIL) 500 MG capsule capsule, Take  by mouth Daily With Breakfast., Disp: , Rfl:   •  Paraffin wax, Use as directed for treatment of dry hands,  Disp: 454 g, Rfl: 3  •  predniSONE (DELTASONE) 20 MG tablet, 1 tablet twice a day, Disp: 10 tablet, Rfl: 0    Allergies:   Allergies   Allergen Reactions   • Prednisolone Rash       IPSS Questionnaire (AUA-7):  Over the past month…    1)  Incomplete Emptying  How often have you had a sensation of not emptying your bladder?  0 - Not at all   2)  Frequency  How often have you had to urinate less than every two hours? 1 - Less than 1 time in 5   3)  Intermittency  How often have you found you stopped and started again several times when you urinated?  0 - Not at all   4) Urgency  How often have you found it difficult to postpone urination?  0 - Not at all   5) Weak Stream  How often have you had a weak urinary stream?  0 - Not at all   6) Straining  How often have you had to push or strain to begin urination?  0 - Not at all   7) Nocturia  How many times did you typically get up at night to urinate?  1 - 1 time   Total Score:  2       Quality of life due to urinary symptoms:  If you were to spend the rest of your life with your urinary condition the way it is now, how would you feel about that? 1-Pleased   Urine Leakage (Incontinence) 0-No Leakage     Sexual Health Inventory for Men (NICO)   Over the past 6 months:     1. How do you rate your confidence that you could get and keep an erection?  4 - High    2. When you had erections with sexual                                     stimulation, how often were your erections hard enough for penetration (entering your partner)?  4 - Most times ( much more than, half the time)   3. During sexual intercourse, how often were you able to maintain your erection after you had penetrated (entered) your partner?  5 - Almost always or always    4. During sexual intercourse, how difficult was it to maintain your erection to completion of intercourse?  5 - Not difficult    5. When you attempted sexual intercourse, how often was it satisfactory for you?  5 - Almost always or always      "Total Score: 23   The Sexual Health Inventory for Men further classifies ED severity with the following breakpoints:   1-7 (Severe ED) 8-11 (Moderate ED) 12-16 (Mild to Moderate ED) 17-21 (Mild ED)            Objective     Physical Exam:   Vital Signs:   Vitals:    11/05/21 1106   BP: 138/88   Pulse: 104   SpO2: 96%   Weight: 84.8 kg (187 lb)   Height: 162.6 cm (64.02\")   PainSc: 0-No pain     Body mass index is 32.08 kg/m².     Physical Exam  Vitals and nursing note reviewed.   Constitutional:       Appearance: Normal appearance. He is normal weight.   Cardiovascular:      Comments: Well-perfused  Pulmonary:      Effort: Pulmonary effort is normal.   Abdominal:      General: Abdomen is flat.      Palpations: Abdomen is soft.   Musculoskeletal:         General: Normal range of motion.   Skin:     General: Skin is warm and dry.   Neurological:      General: No focal deficit present.      Mental Status: He is alert and oriented to person, place, and time. Mental status is at baseline.   Psychiatric:         Mood and Affect: Mood normal.         Behavior: Behavior normal.         Thought Content: Thought content normal.         Judgment: Judgment normal.         Genitourinary  Penis: circumcised penis, orthotopic meatus, glans normal, no penile discharge.  No rashes/lesions.  Palpable Peyronie's plaque proximal one third of the penile shaft located dorsally.  Nontender to palpation  Testes: descended bilaterally, no masses, nontender to palpation. Remainder of scrotal contents normal. No hernia appreciated.    Labs:   Brief Urine Lab Results     None               Lab Results   Component Value Date    GLUCOSE 94 01/22/2021    CALCIUM 9.1 01/22/2021     01/22/2021    K 5.0 01/22/2021    CO2 27.5 01/22/2021     01/22/2021    BUN 19 01/22/2021    CREATININE 0.95 01/22/2021    EGFRIFAFRI 99 01/22/2021    EGFRIFNONA 82 01/22/2021    BCR 20.0 01/22/2021       Lab Results   Component Value Date    WBC 6.33 " 01/22/2021    HGB 15.2 01/22/2021    HCT 45.7 01/22/2021    MCV 86.6 01/22/2021     01/22/2021     I personally viewed the patient's recent laboratory values including a creatinine of 0.9 in 1021    Images:   No Images in the past 120 days found..    Measures:   Tobacco:   Alberto Fernandez  reports that he has quit smoking. His smoking use included cigarettes. He has a 3.75 pack-year smoking history. He has never used smokeless tobacco.. I have educated him on the risk of diseases from using tobacco products.    Assessment / Plan      Assessment/Plan:   56 y.o. male is here today for for Peyronie's disease.  He reports an episode of penile trauma during intercourse approximately 1 year ago.  Reports onset of curvature over the past 6 to 9 months with dorsal curvature that is not preventing intercourse at this time.  He reports his partner is having some pain during intercourse which resulted him presenting for evaluation.  He denies difficulty achieving or maintaining erection, denies pain with erection or ejaculation.  Denies lower urinary tract symptoms.    Diagnoses and all orders for this visit:    1. Peyronie's disease (Primary)       Patient education:  Today we discussed the etiology and management of Peyronie's disease.  We discussed work-up including history and physical exam.  We discussed this is a process in which scar tissue or results after episode of penile trauma or repetitive microinjury.  We discussed curvature as a result of scar tissue which forms in the tunica albuginea of the corpora cavernosa.  We discussed that scar tissue does not allow for lengthening an erection or result in curvature with erection.  We discussed there is often an acute phase which is resulted with pain which can be treated with NSAIDs in a chronic phase in which there is plaque stabilization.  He is at the point of chronic phase with stabilization and denies change in his curvature recently.  He feels his curvature  is around 45 degrees, we do not have pictures to evaluate today.  We discussed conservative and surgical strategies for repair.  We discussed that often intervention is reserved for patients that have curvature significant enough to prevent penetration during intercourse.  We discussed strategies including Xiaflex, plication, decision and grafting.  We discussed the risks and benefits of each of these strategies.  At this time the patient reports his curvature is not significant enough to prevent intercourse and he does not desire intervention at this time.  He will contact us if he has worsening or changes or desires further follow-up.    Follow Up:   No follow-ups on file.    I spent 45 minutes providing clinical care for this patient; including review of patient's chart and provider documentation, face to face time spent with patient in examination room (obtaining history, performing physical exam, discussing diagnosis and management options), placing orders, and completing patient documentation.     Cl Plunkett MD  [facility] [place]

## 2021-11-19 ENCOUNTER — FLU SHOT (OUTPATIENT)
Dept: FAMILY MEDICINE CLINIC | Facility: CLINIC | Age: 56
End: 2021-11-19

## 2021-11-19 DIAGNOSIS — Z23 NEED FOR INFLUENZA VACCINATION: Primary | ICD-10-CM

## 2021-11-19 PROCEDURE — 90471 IMMUNIZATION ADMIN: CPT | Performed by: NURSE PRACTITIONER

## 2021-11-19 PROCEDURE — 90686 IIV4 VACC NO PRSV 0.5 ML IM: CPT | Performed by: NURSE PRACTITIONER

## 2022-01-04 ENCOUNTER — TELEPHONE (OUTPATIENT)
Dept: FAMILY MEDICINE CLINIC | Facility: CLINIC | Age: 57
End: 2022-01-04

## 2022-01-04 NOTE — TELEPHONE ENCOUNTER
PATIENT GOT COVID BOOSTER YESTERDAY.  NOW HAVING HEADACHE.  WHAT TO TAKE?    PLEASE CALL [phone]

## 2022-06-27 ENCOUNTER — TELEPHONE (OUTPATIENT)
Dept: FAMILY MEDICINE CLINIC | Facility: CLINIC | Age: 57
End: 2022-06-27

## 2022-06-27 ENCOUNTER — OFFICE VISIT (OUTPATIENT)
Dept: FAMILY MEDICINE CLINIC | Facility: CLINIC | Age: 57
End: 2022-06-27

## 2022-06-27 VITALS — BODY MASS INDEX: 30.9 KG/M2 | TEMPERATURE: 98.4 F | WEIGHT: 181 LBS | RESPIRATION RATE: 18 BRPM | HEIGHT: 64 IN

## 2022-06-27 DIAGNOSIS — U07.1 COVID-19 VIRUS DETECTED: Primary | ICD-10-CM

## 2022-06-27 LAB
EXPIRATION DATE: NORMAL
FLUAV AG UPPER RESP QL IA.RAPID: NOT DETECTED
FLUBV AG UPPER RESP QL IA.RAPID: NOT DETECTED
INTERNAL CONTROL: NORMAL
Lab: NORMAL
SARS-COV-2 AG UPPER RESP QL IA.RAPID: NOT DETECTED

## 2022-06-27 PROCEDURE — 99213 OFFICE O/P EST LOW 20 MIN: CPT | Performed by: FAMILY MEDICINE

## 2022-06-27 PROCEDURE — 87428 SARSCOV & INF VIR A&B AG IA: CPT | Performed by: FAMILY MEDICINE

## 2022-06-27 NOTE — PROGRESS NOTES
Subjective   Alberto Fernandez is a 57 y.o. male.     History of Present Illness     Pt has felt ill the past 2-3 days with fatigue and malaise  He had positive home test for COVID  He work will not take at home test    He has had sweats, minor throat sore and body aches  No fever        Review of Systems   Constitutional: Positive for chills and fatigue.       Objective   Physical Exam  Vitals and nursing note reviewed.   Constitutional:       General: He is not in acute distress.     Appearance: Normal appearance. He is well-developed.   Cardiovascular:      Rate and Rhythm: Normal rate and regular rhythm.      Heart sounds: Normal heart sounds.   Pulmonary:      Effort: Pulmonary effort is normal.      Breath sounds: Normal breath sounds.   Neurological:      Mental Status: He is alert and oriented to person, place, and time.   Psychiatric:         Mood and Affect: Mood normal.         Behavior: Behavior normal.         Thought Content: Thought content normal.         Judgment: Judgment normal.         Assessment & Plan   Diagnoses and all orders for this visit:    1. COVID-19 virus detected (Primary)  -     POCT SARS-CoV-2 Antigen MATTEO + Flu    with positive home COVID test and symptoms, I would recommend he quarantine for 5 days and treat him as COVID positive.  Work note given  Fluids, rest, ibuprofen.  He will call with any further issues

## 2022-06-27 NOTE — TELEPHONE ENCOUNTER
Caller: Alberto Fernandez    Relationship: Self    Best call back number: [phone]    What orders are you requesting (i.e. lab or imaging): COVID PCR TEST    In what timeframe would the patient need to come in: ASAP    Where will you receive your lab/imaging services:IN OFFICE    Additional notes: PATIENT STATES THAT HE NEEDS A LETTER FOR HIS JOB STATING THAT HE TESTED POSITIVE FOR COVID. PATIENT STATES THAT HE TESTED POS FOR COVID ON 06/25/22 WITH AN AT HOME TEST.

## 2022-06-28 ENCOUNTER — TELEPHONE (OUTPATIENT)
Dept: FAMILY MEDICINE CLINIC | Facility: CLINIC | Age: 57
End: 2022-06-28

## 2022-06-28 NOTE — TELEPHONE ENCOUNTER
Caller: Alberto Fernandez    Relationship: Self    Best call back number: [phone]    Caller requesting test results: YES    What test was performed: COVID    When was the test performed: 06/27/22    Where was the test performed: AT OFFICE    Additional notes:

## 2022-06-29 ENCOUNTER — TELEPHONE (OUTPATIENT)
Dept: FAMILY MEDICINE CLINIC | Facility: CLINIC | Age: 57
End: 2022-06-29

## 2022-06-29 NOTE — TELEPHONE ENCOUNTER
I would treat him like he had + COVID based on his one positive test.  I would recommend he NOT return to work until 7/5/22, next week

## 2022-06-29 NOTE — TELEPHONE ENCOUNTER
Caller: Alberto Fernandez     Relationship: Self     Best call back number: [phone]     Caller requesting test results: YES     What test was performed: COVID     When was the test performed: 06/27/22     Where was the test performed: AT OFFICE     Additional notes:  CONCERNED BECAUSE THE HOME TEST WAS POSITIVE AND THE IN OFFICE RAPID WAS NEGATIVE, AND HE WANTS TO MAKE SURE IS OKAY TO RETURN TO WORK WITHOUT RESTRICTION.    PLEASE CALL AND ADVISE

## 2022-06-29 NOTE — TELEPHONE ENCOUNTER
Called informed pt. Patient will need a work note stating due to + covid and return date. Faxed to number below.     Fax [phone]  Include the following.   [id number]  Claim # [id number]

## 2022-07-14 NOTE — TELEPHONE ENCOUNTER
PATIENT CALLED ABOUT GETTING THE WORK EXCUSE TO BE FAXED OVER TO THE WORK COMPANY.  Fax [phone]

## 2022-10-05 ENCOUNTER — FLU SHOT (OUTPATIENT)
Dept: FAMILY MEDICINE CLINIC | Facility: CLINIC | Age: 57
End: 2022-10-05

## 2022-10-05 DIAGNOSIS — Z23 NEED FOR INFLUENZA VACCINATION: Primary | ICD-10-CM

## 2022-10-05 PROCEDURE — 90686 IIV4 VACC NO PRSV 0.5 ML IM: CPT | Performed by: FAMILY MEDICINE

## 2022-10-05 PROCEDURE — 90471 IMMUNIZATION ADMIN: CPT | Performed by: FAMILY MEDICINE

## 2022-10-19 ENCOUNTER — TELEPHONE (OUTPATIENT)
Dept: FAMILY MEDICINE CLINIC | Facility: CLINIC | Age: 57
End: 2022-10-19

## 2022-10-19 NOTE — TELEPHONE ENCOUNTER
Patient called has body aches, congestion, chills, coughing up phlem, hot/cold, no fever did a home test for covid it was negative.  Wife tested positive for Flu on Tuesday, we have NO same day appts.  Could we call in Tamiflu and a cough medication.  Please advise and let pt know?  Thank you.

## 2022-10-20 NOTE — TELEPHONE ENCOUNTER
When did his symptoms start?  He is on schedule tomorrow for same day appt. I haven't seen him myself since November 2020.

## 2025-02-21 ENCOUNTER — TELEPHONE (OUTPATIENT)
Dept: FAMILY MEDICINE CLINIC | Facility: CLINIC | Age: 60
End: 2025-02-21
Payer: OTHER GOVERNMENT

## 2025-02-21 NOTE — TELEPHONE ENCOUNTER
Caller: ENA BUTLER    Relationship to patient: Emergency Contact    Best call back number: [phone]     Chief complaint: STOMACH BLOAT, LAB WORK     Type of visit: PHYSICAL    Additional notes:PATIENT HAS BEEN SCHEDULED FOR PHYSICAL WITH ANOTHER PCP DUE TO HIS SCHEDULING NEEDS.

## 2025-02-28 ENCOUNTER — HOSPITAL ENCOUNTER (OUTPATIENT)
Dept: GENERAL RADIOLOGY | Facility: HOSPITAL | Age: 60
Discharge: HOME | End: 2025-02-28
Payer: OTHER GOVERNMENT

## 2025-02-28 ENCOUNTER — OFFICE VISIT (OUTPATIENT)
Dept: FAMILY MEDICINE CLINIC | Facility: CLINIC | Age: 60
End: 2025-02-28
Payer: OTHER GOVERNMENT

## 2025-02-28 VITALS
SYSTOLIC BLOOD PRESSURE: 134 MMHG | HEIGHT: 64 IN | HEART RATE: 66 BPM | OXYGEN SATURATION: 98 % | DIASTOLIC BLOOD PRESSURE: 78 MMHG | RESPIRATION RATE: 18 BRPM | WEIGHT: 185 LBS | BODY MASS INDEX: 31.58 KG/M2

## 2025-02-28 DIAGNOSIS — E03.9 HYPOTHYROIDISM, UNSPECIFIED TYPE: ICD-10-CM

## 2025-02-28 DIAGNOSIS — M79.675 TOE PAIN, LEFT: ICD-10-CM

## 2025-02-28 DIAGNOSIS — Z12.5 ENCOUNTER FOR SCREENING PROSTATE SPECIFIC ANTIGEN (PSA) MEASUREMENT: ICD-10-CM

## 2025-02-28 DIAGNOSIS — Z00.00 ENCOUNTER FOR WELL ADULT EXAM WITHOUT ABNORMAL FINDINGS: Primary | ICD-10-CM

## 2025-02-28 DIAGNOSIS — E73.9 LACTOSE INTOLERANCE: ICD-10-CM

## 2025-02-28 PROBLEM — G47.33 OSA (OBSTRUCTIVE SLEEP APNEA): Status: ACTIVE | Noted: 2025-02-28

## 2025-02-28 PROCEDURE — 73660 X-RAY EXAM OF TOE(S): CPT

## 2025-02-28 NOTE — ASSESSMENT & PLAN NOTE
Will complete blood work. Discussed the importance of a healthy, well-balanced diet and active lifestyle. Return in 1 year for annual physical.

## 2025-02-28 NOTE — ASSESSMENT & PLAN NOTE
Advised patient to minimalize/cut out all dairy in his diet. If symptoms fail to improve with diet changes he should return for further workup and evaluation.

## 2025-02-28 NOTE — PROGRESS NOTES
Office Note     Name: Alberto Fernandez    : 1965     MRN: [id number]     Chief Complaint  Annual Exam, Bloated (Pt states off and on for a while), and Toe Injury (Pt states left foot middle tow is possible broke, toe made contact with the couch while walking)    Subjective     History of Present Illness:  Alberto Fernandez is a 60 y.o. male who presents today for  an annual visit.     Has no significant PMH.    Complains of bloating over the last 6 months. Admits that bloating most often occurs after he consumes dairy. Denies diarrhea and gas. Has noticed that symptoms have improved since he switched to almond milk.    Also complains of left toe pain. Stubbed his 3rd L toe approximately 1 week ago and has had bruising, pain, and swelling since. Pain is worse with walking. Has tried using ice which has improved his swelling and pain. Denies taking any OTC medications.     Admits to well-balanced diet but says that he has room for improvement. Diet includes both fruits and vegetables. Drinks some water but not enough. Sleeps well for the most part. Wears a C-PAP for CLEMENT.     Has no other complains or concerns.     Past Medical History:   Past Medical History:   Diagnosis Date    Eczema     Hyperlipidemia        Past Surgical History:   Past Surgical History:   Procedure Laterality Date    HERNIA REPAIR      VASECTOMY  2015       Immunizations:   Immunization History   Administered Date(s) Administered    COVID-19 (PFIZER) Purple Cap Monovalent 2021, 2021, 2022    Flu Vaccine Quad PF >36MO 10/23/2015, 2016, 2017, 10/29/2018, 10/14/2019, 10/14/2020    Fluzone (or Fluarix & Flulaval for VFC) >6mos 10/14/2020, 2021, 10/05/2022    Hepatitis A 2018, 2019    Influenza TIV (IM) 2013, 10/11/2013, 10/17/2014        Medications:     Current Outpatient Medications:     albuterol sulfate  (90 Base) MCG/ACT inhaler, Inhale 2 puffs Every 4 (Four) Hours As  "Needed for Wheezing., Disp: 18 g, Rfl: 0    Flaxseed, Linseed, (FLAX SEED OIL) 1000 MG capsule, Take  by mouth., Disp: , Rfl:     levocetirizine (XYZAL) 5 MG tablet, Take 1 tablet by mouth Every Evening., Disp: , Rfl:     Omega-3 Fatty Acids (FISH OIL) 500 MG capsule capsule, Take  by mouth Daily With Breakfast., Disp: , Rfl:     Paraffin wax, Use as directed for treatment of dry hands, Disp: 454 g, Rfl: 3    predniSONE (DELTASONE) 20 MG tablet, 1 tablet twice a day, Disp: 10 tablet, Rfl: 0    Allergies:   Allergies   Allergen Reactions    Prednisolone Rash       Family History:   Family History   Problem Relation Age of Onset    Diabetes Mother     Cancer Father     Cancer Brother        Social History:   Social History     Socioeconomic History    Marital status:    Tobacco Use    Smoking status: Former     Current packs/day: 0.25     Average packs/day: 0.3 packs/day for 15.0 years (3.8 ttl pk-yrs)     Types: Cigarettes    Smokeless tobacco: Never   Substance and Sexual Activity    Alcohol use: Not Currently     Alcohol/week: 0.0 standard drinks of alcohol    Drug use: Never    Sexual activity: Defer       Objective     Vital Signs  /78 (BP Location: Left arm, Patient Position: Sitting, Cuff Size: Adult)   Pulse 66   Resp 18   Ht 162.6 cm (64\")   Wt 83.9 kg (185 lb)   SpO2 98%   BMI 31.76 kg/m²   Estimated body mass index is 31.76 kg/m² as calculated from the following:    Height as of this encounter: 162.6 cm (64\").    Weight as of this encounter: 83.9 kg (185 lb).    BMI is >= 30 and <35. (Class 1 Obesity). The following options were offered after discussion;: exercise counseling/recommendations and nutrition counseling/recommendations    Physical Exam  Vitals and nursing note reviewed.   Constitutional:       Appearance: Normal appearance.   HENT:      Head: Normocephalic and atraumatic.   Cardiovascular:      Rate and Rhythm: Normal rate and regular rhythm.      Heart sounds: No murmur " heard.     No friction rub. No gallop.   Pulmonary:      Effort: Pulmonary effort is normal.      Breath sounds: Normal breath sounds. No wheezing, rhonchi or rales.   Musculoskeletal:      Right foot: Swelling, tenderness and bony tenderness present.        Legs:    Skin:     General: Skin is warm and dry.   Neurological:      General: No focal deficit present.      Mental Status: He is alert and oriented to person, place, and time.   Psychiatric:         Mood and Affect: Mood normal.         Behavior: Behavior normal.        Assessment and Plan     Diagnoses and all orders for this visit:    1. Encounter for well adult exam without abnormal findings (Primary)  Assessment & Plan:  Will complete blood work. Discussed the importance of a healthy, well-balanced diet and active lifestyle. Return in 1 year for annual physical.     Orders:  -     CBC & Differential  -     TSH  -     Comprehensive Metabolic Panel  -     Lipid Panel    2. Toe pain, left  Assessment & Plan:  Have ordered x-ray. Advised patient to continue applying ice to the affected area and taking OTC pain relievers as needed for symptomatic relief. Return if symptoms worsen or fail to improve.     Orders:  -     XR Toe 2+ View Left (In Office); Future    3. Lactose intolerance  Assessment & Plan:  Advised patient to minimalize/cut out all dairy in his diet. If symptoms fail to improve with diet changes he should return for further workup and evaluation.       4. Encounter for screening prostate specific antigen (PSA) measurement  -     PSA SCREENING    5. Hypothyroidism, unspecified type  -     TSH       Follow Up  Return in about 1 year (around 2/28/2026).    SURI Rodriguez CO  [facility]  [address] [address] [phone]

## 2025-02-28 NOTE — ASSESSMENT & PLAN NOTE
Have ordered x-ray. Advised patient to continue applying ice to the affected area and taking OTC pain relievers as needed for symptomatic relief. Return if symptoms worsen or fail to improve.

## 2025-03-01 LAB
ALBUMIN SERPL-MCNC: 4.6 G/DL (ref 3.9–4.9)
ALP SERPL-CCNC: 97 IU/L (ref 44–121)
ALT SERPL-CCNC: 94 IU/L (ref 0–44)
AST SERPL-CCNC: 38 IU/L (ref 0–40)
BASOPHILS # BLD AUTO: 0 X10E3/UL (ref 0–0.2)
BASOPHILS NFR BLD AUTO: 1 %
BILIRUB SERPL-MCNC: 0.5 MG/DL (ref 0–1.2)
BUN SERPL-MCNC: 14 MG/DL (ref 8–27)
BUN/CREAT SERPL: 14 (ref 10–24)
CALCIUM SERPL-MCNC: 9.9 MG/DL (ref 8.6–10.2)
CHLORIDE SERPL-SCNC: 103 MMOL/L (ref 96–106)
CHOLEST SERPL-MCNC: 275 MG/DL (ref 100–199)
CO2 SERPL-SCNC: 25 MMOL/L (ref 20–29)
CREAT SERPL-MCNC: 0.98 MG/DL (ref 0.76–1.27)
EGFRCR SERPLBLD CKD-EPI 2021: 88 ML/MIN/1.73
EOSINOPHIL # BLD AUTO: 0.3 X10E3/UL (ref 0–0.4)
EOSINOPHIL NFR BLD AUTO: 4 %
ERYTHROCYTE [DISTWIDTH] IN BLOOD BY AUTOMATED COUNT: 13.7 % (ref 11.6–15.4)
GLOBULIN SER CALC-MCNC: 2.1 G/DL (ref 1.5–4.5)
GLUCOSE SERPL-MCNC: 100 MG/DL (ref 70–99)
HCT VFR BLD AUTO: 45.9 % (ref 37.5–51)
HDLC SERPL-MCNC: 32 MG/DL
HGB BLD-MCNC: 15.3 G/DL (ref 13–17.7)
IMM GRANULOCYTES # BLD AUTO: 0 X10E3/UL (ref 0–0.1)
IMM GRANULOCYTES NFR BLD AUTO: 0 %
LDLC SERPL CALC-MCNC: 179 MG/DL (ref 0–99)
LYMPHOCYTES # BLD AUTO: 2.1 X10E3/UL (ref 0.7–3.1)
LYMPHOCYTES NFR BLD AUTO: 30 %
MCH RBC QN AUTO: 29.6 PG (ref 26.6–33)
MCHC RBC AUTO-ENTMCNC: 33.3 G/DL (ref 31.5–35.7)
MCV RBC AUTO: 89 FL (ref 79–97)
MONOCYTES # BLD AUTO: 0.7 X10E3/UL (ref 0.1–0.9)
MONOCYTES NFR BLD AUTO: 10 %
NEUTROPHILS # BLD AUTO: 3.9 X10E3/UL (ref 1.4–7)
NEUTROPHILS NFR BLD AUTO: 55 %
PLATELET # BLD AUTO: 207 X10E3/UL (ref 150–450)
POTASSIUM SERPL-SCNC: 4.5 MMOL/L (ref 3.5–5.2)
PROT SERPL-MCNC: 6.7 G/DL (ref 6–8.5)
PSA SERPL-MCNC: 4.5 NG/ML (ref 0–4)
RBC # BLD AUTO: 5.17 X10E6/UL (ref 4.14–5.8)
SODIUM SERPL-SCNC: 142 MMOL/L (ref 134–144)
TRIGL SERPL-MCNC: 326 MG/DL (ref 0–149)
TSH SERPL DL<=0.005 MIU/L-ACNC: 1.64 UIU/ML (ref 0.45–4.5)
VLDLC SERPL CALC-MCNC: 64 MG/DL (ref 5–40)
WBC # BLD AUTO: 7 X10E3/UL (ref 3.4–10.8)

## 2025-03-04 DIAGNOSIS — E78.5 HYPERLIPIDEMIA, UNSPECIFIED HYPERLIPIDEMIA TYPE: Primary | ICD-10-CM

## 2025-03-04 RX ORDER — ROSUVASTATIN CALCIUM 10 MG/1
10 TABLET, COATED ORAL DAILY
Qty: 90 TABLET | Refills: 3 | Status: SHIPPED | OUTPATIENT
Start: 2025-03-04 | End: 2025-03-07 | Stop reason: SDUPTHER

## 2025-03-05 DIAGNOSIS — R74.8 ELEVATED LIVER ENZYMES: Primary | ICD-10-CM

## 2025-03-07 DIAGNOSIS — E78.5 HYPERLIPIDEMIA, UNSPECIFIED HYPERLIPIDEMIA TYPE: ICD-10-CM

## 2025-03-07 RX ORDER — ROSUVASTATIN CALCIUM 10 MG/1
10 TABLET, COATED ORAL DAILY
Qty: 90 TABLET | Refills: 3 | Status: SHIPPED | OUTPATIENT
Start: 2025-03-07

## 2025-03-21 DIAGNOSIS — R97.20 ELEVATED PSA, LESS THAN 10 NG/ML: Primary | ICD-10-CM

## 2025-03-21 DIAGNOSIS — Z12.11 ENCOUNTER FOR SCREENING FOR MALIGNANT NEOPLASM OF COLON: ICD-10-CM

## 2025-05-20 ENCOUNTER — OFFICE VISIT (OUTPATIENT)
Dept: FAMILY MEDICINE CLINIC | Facility: CLINIC | Age: 60
End: 2025-05-20
Payer: OTHER GOVERNMENT

## 2025-05-20 VITALS
SYSTOLIC BLOOD PRESSURE: 130 MMHG | RESPIRATION RATE: 18 BRPM | HEART RATE: 68 BPM | DIASTOLIC BLOOD PRESSURE: 74 MMHG | WEIGHT: 178 LBS | BODY MASS INDEX: 30.39 KG/M2 | HEIGHT: 64 IN | TEMPERATURE: 97.8 F

## 2025-05-20 DIAGNOSIS — S93.601A SPRAIN OF RIGHT FOOT, INITIAL ENCOUNTER: ICD-10-CM

## 2025-05-20 DIAGNOSIS — M10.9 ACUTE GOUT OF LEFT KNEE, UNSPECIFIED CAUSE: Primary | ICD-10-CM

## 2025-05-20 RX ORDER — COLCHICINE 0.6 MG/1
0.6 TABLET ORAL 2 TIMES DAILY
Qty: 10 TABLET | Refills: 0 | Status: SHIPPED | OUTPATIENT
Start: 2025-05-20 | End: 2025-05-25

## 2025-05-20 RX ORDER — INDOMETHACIN 50 MG/1
50 CAPSULE ORAL
Qty: 15 CAPSULE | Refills: 0 | Status: SHIPPED | OUTPATIENT
Start: 2025-05-20 | End: 2025-05-25

## 2025-05-20 NOTE — PROGRESS NOTES
Office Note     Name: Alberto Fernandez    : 1965     MRN: [id number]     Chief Complaint  Joint Swelling (Left Knee ) and Pain (Rt foot pain)    Subjective     History of Present Illness:  Alberto Fernandez is a 60 y.o. male who presents today with complaints of left knee and right foot pain.    Left knee pain has been present for a month and a half. Denies any known injury. Pain is present with palpation but not worsened with movement or weight bearing. Admits to warmth and redness of the knee. Also experienced swelling but that has somewhat improved. [facility] which was normal. Denies any fevers.     Right foot pain began last night. Was active playing soccer but denies known injury. Pain is present on the bottom of his foot and is worsened with weight bearing and movement. Describes the pain as sharp with certain movements and experiences some radiation up his leg. Denies numbness and tingling. Denies swelling and bruising.      Past Medical History:   Past Medical History:   Diagnosis Date    Eczema     Hyperlipidemia        Past Surgical History:   Past Surgical History:   Procedure Laterality Date    HERNIA REPAIR      VASECTOMY  2015       Immunizations:   Immunization History   Administered Date(s) Administered    COVID-19 (PFIZER) Purple Cap Monovalent 2021, 2021, 2022    Flu Vaccine Quad PF >36MO 10/23/2015, 2016, 2017, 10/29/2018, 10/14/2019, 10/14/2020    Fluzone (or Fluarix & Flulaval for VFC) >6mos 10/14/2020, 2021, 10/05/2022    Hepatitis A 2018, 2019    Influenza TIV (IM) 2013, 10/11/2013, 10/17/2014        Medications:     Current Outpatient Medications:     albuterol sulfate  (90 Base) MCG/ACT inhaler, Inhale 2 puffs Every 4 (Four) Hours As Needed for Wheezing. (Patient not taking: Reported on 2025), Disp: 18 g, Rfl: 0    colchicine 0.6 MG tablet, Take 1 tablet by mouth 2 (Two) Times a  "Day for 5 days., Disp: 10 tablet, Rfl: 0    Flaxseed, Linseed, (FLAX SEED OIL) 1000 MG capsule, Take  by mouth. (Patient not taking: Reported on 5/20/2025), Disp: , Rfl:     indomethacin (INDOCIN) 50 MG capsule, Take 1 capsule by mouth 3 (Three) Times a Day With Meals for 5 days., Disp: 15 capsule, Rfl: 0    levocetirizine (XYZAL) 5 MG tablet, Take 1 tablet by mouth Every Evening. (Patient not taking: Reported on 5/20/2025), Disp: , Rfl:     Omega-3 Fatty Acids (FISH OIL) 500 MG capsule capsule, Take  by mouth Daily With Breakfast. (Patient not taking: Reported on 5/20/2025), Disp: , Rfl:     rosuvastatin (Crestor) 10 MG tablet, Take 1 tablet by mouth Daily. (Patient not taking: Reported on 5/20/2025), Disp: 90 tablet, Rfl: 3    Allergies:   Allergies   Allergen Reactions    Prednisolone Rash       Family History:   Family History   Problem Relation Age of Onset    Diabetes Mother     Cancer Father     Cancer Brother        Social History:   Social History     Socioeconomic History    Marital status:    Tobacco Use    Smoking status: Former     Current packs/day: 0.25     Average packs/day: 0.3 packs/day for 15.0 years (3.8 ttl pk-yrs)     Types: Cigarettes    Smokeless tobacco: Never   Vaping Use    Vaping status: Never Used   Substance and Sexual Activity    Alcohol use: Not Currently     Alcohol/week: 0.0 standard drinks of alcohol    Drug use: Never    Sexual activity: Defer       Objective     Vital Signs  /74   Pulse 68   Temp 97.8 °F (36.6 °C)   Resp 18   Ht 162.6 cm (64.02\")   Wt 80.7 kg (178 lb)   BMI 30.54 kg/m²   Estimated body mass index is 30.54 kg/m² as calculated from the following:    Height as of this encounter: 162.6 cm (64.02\").    Weight as of this encounter: 80.7 kg (178 lb).    Physical Exam  Vitals and nursing note reviewed.   Constitutional:       Appearance: Normal appearance.   HENT:      Head: Normocephalic and atraumatic.   Cardiovascular:      Rate and Rhythm: Normal " rate and regular rhythm.      Heart sounds: No murmur heard.     No friction rub. No gallop.   Pulmonary:      Effort: Pulmonary effort is normal.      Breath sounds: Normal breath sounds. No wheezing, rhonchi or rales.   Musculoskeletal:      Right knee: Normal.      Left knee: Swelling, effusion and erythema present. Tenderness present.      Right foot: Decreased range of motion. Tenderness present. No swelling, deformity or bony tenderness.      Left foot: Normal.      Comments: L knee warm to palpation   Skin:     General: Skin is warm and dry.   Neurological:      General: No focal deficit present.      Mental Status: He is alert and oriented to person, place, and time.   Psychiatric:         Mood and Affect: Mood normal.         Behavior: Behavior normal.          Assessment and Plan     Diagnoses and all orders for this visit:    1. Acute gout of left knee, unspecified cause (Primary)  Assessment & Plan:  Initiate indomethacin and colchicine and continue until symptoms resolve  Discussed mechanism of medications and potential adverse effects  If symptoms fail to improve once medications are complete, patient should contact the office    Orders:  -     indomethacin (INDOCIN) 50 MG capsule; Take 1 capsule by mouth 3 (Three) Times a Day With Meals for 5 days.  Dispense: 15 capsule; Refill: 0  -     colchicine 0.6 MG tablet; Take 1 tablet by mouth 2 (Two) Times a Day for 5 days.  Dispense: 10 tablet; Refill: 0    2. Sprain of right foot, initial encounter  Assessment & Plan:  Rest, activity modification, apply ice to the affected areas, and take OTC pain relievers as needed for symptomatic relief  Return if symptoms worsen or fail to improve         Follow Up  No follow-ups on file.    SURI Rodriguez  [facility]  [address] [id number] [phone]

## 2025-05-20 NOTE — ASSESSMENT & PLAN NOTE
Initiate indomethacin and colchicine and continue until symptoms resolve  Discussed mechanism of medications and potential adverse effects  If symptoms fail to improve once medications are complete, patient should contact the office

## 2025-05-20 NOTE — ASSESSMENT & PLAN NOTE
Rest, activity modification, apply ice to the affected areas, and take OTC pain relievers as needed for symptomatic relief  Return if symptoms worsen or fail to improve

## 2025-05-22 RX ORDER — SODIUM, POTASSIUM,MAG SULFATES 17.5-3.13G
1 SOLUTION, RECONSTITUTED, ORAL ORAL TAKE AS DIRECTED
Qty: 354 ML | Refills: 0 | Status: SHIPPED | OUTPATIENT
Start: 2025-05-22

## 2025-05-30 ENCOUNTER — OUTSIDE FACILITY SERVICE (OUTPATIENT)
Dept: GASTROENTEROLOGY | Facility: CLINIC | Age: 60
End: 2025-05-30
Payer: OTHER GOVERNMENT

## 2025-05-30 PROCEDURE — 45385 COLONOSCOPY W/LESION REMOVAL: CPT | Performed by: INTERNAL MEDICINE

## 2025-05-30 PROCEDURE — 88305 TISSUE EXAM BY PATHOLOGIST: CPT | Performed by: INTERNAL MEDICINE

## 2025-06-02 ENCOUNTER — LAB REQUISITION (OUTPATIENT)
Dept: LAB | Facility: HOSPITAL | Age: 60
End: 2025-06-02
Payer: OTHER GOVERNMENT

## 2025-06-02 DIAGNOSIS — K64.8 OTHER HEMORRHOIDS: ICD-10-CM

## 2025-06-02 DIAGNOSIS — Z12.11 ENCOUNTER FOR SCREENING FOR MALIGNANT NEOPLASM OF COLON: ICD-10-CM

## 2025-06-02 DIAGNOSIS — D12.3 BENIGN NEOPLASM OF TRANSVERSE COLON: ICD-10-CM

## 2025-06-03 ENCOUNTER — RESULTS FOLLOW-UP (OUTPATIENT)
Dept: LAB | Facility: HOSPITAL | Age: 60
End: 2025-06-03
Payer: OTHER GOVERNMENT

## 2025-06-03 LAB
CYTO UR: NORMAL
LAB AP CASE REPORT: NORMAL
LAB AP CLINICAL INFORMATION: NORMAL
PATH REPORT.FINAL DX SPEC: NORMAL
PATH REPORT.GROSS SPEC: NORMAL

## 2025-06-03 NOTE — LETTER
"Patricia 3, 2025     Alberto Fernandez  [address] [place] [address]        Dear Alberto:    Below are the results from your recent visit:    Resulted Orders   Tissue Pathology Exam   Result Value Ref Range    Case Report       Surgical Pathology Report                         Case: [id number]                                  Authorizing Provider:  James Oneill MD    Collected:           05/30/2025 01:10 PM          Ordering Location:     [facility]   Received:            06/02/2025 10:21 AM                                 LABORATORY                                                                   Pathologist:           Howard Meredith MD                                                       Specimen:    Large Intestine, Hepatic Flexure                                                           Clinical Information       Encounter for screening for malignant neoplasm of colon  Benign neoplasm of transverse colon  Other hemorrhoids      Final Diagnosis       HEPATIC FLEXURE COLON POLYP, BIOPSY:     Fragments of sessile serrated adenoma     Negative for dysplasia or carcinoma        Gross Description       1. Large Intestine, Hepatic Flexure.  Received in formalin labeled \"hepatic flexure polyp\" is a 0.6 x 0.2 x 0.1 cm aggregate of 2 tan tissue fragments, submitted entirely in a single cassette.  LDP        Microscopic Description       The slides are reviewed and demonstrate histopathologic features supporting the above rendered diagnosis.           There was 1 serrated adenoma. He should have a repeat colonoscopy in 5 years.     If you have any questions or concerns, please don't hesitate to call.    Sincerely,        James Oneill MD      "

## 2025-06-20 ENCOUNTER — TRANSCRIBE ORDERS (OUTPATIENT)
Dept: ADMINISTRATIVE | Facility: HOSPITAL | Age: 60
End: 2025-06-20
Payer: OTHER GOVERNMENT

## 2025-06-20 DIAGNOSIS — R97.20 ELEVATED PROSTATE SPECIFIC ANTIGEN (PSA): Primary | ICD-10-CM

## 2025-07-11 ENCOUNTER — HOSPITAL ENCOUNTER (OUTPATIENT)
Facility: HOSPITAL | Age: 60
Discharge: HOME | End: 2025-07-11
Admitting: UROLOGY
Payer: OTHER GOVERNMENT

## 2025-07-11 DIAGNOSIS — R97.20 ELEVATED PROSTATE SPECIFIC ANTIGEN (PSA): ICD-10-CM

## 2025-07-11 PROCEDURE — A9577 INJ MULTIHANCE: HCPCS | Performed by: UROLOGY

## 2025-07-11 PROCEDURE — 72197 MRI PELVIS W/O & W/DYE: CPT

## 2025-07-11 PROCEDURE — 25510000002 GADOBENATE DIMEGLUMINE 529 MG/ML SOLUTION: Performed by: UROLOGY

## 2025-07-11 RX ADMIN — GADOBENATE DIMEGLUMINE 15 ML: 529 INJECTION, SOLUTION INTRAVENOUS at 10:57

## 2025-08-29 ENCOUNTER — OFFICE VISIT (OUTPATIENT)
Dept: FAMILY MEDICINE CLINIC | Facility: CLINIC | Age: 60
End: 2025-08-29
Payer: OTHER GOVERNMENT

## 2025-08-29 VITALS
BODY MASS INDEX: 30.73 KG/M2 | TEMPERATURE: 97.1 F | RESPIRATION RATE: 16 BRPM | HEIGHT: 64 IN | HEART RATE: 64 BPM | DIASTOLIC BLOOD PRESSURE: 82 MMHG | WEIGHT: 180 LBS | SYSTOLIC BLOOD PRESSURE: 132 MMHG

## 2025-08-29 DIAGNOSIS — N42.31: Primary | ICD-10-CM

## 2025-08-29 PROCEDURE — 99213 OFFICE O/P EST LOW 20 MIN: CPT

## 2025-08-30 LAB
ALBUMIN SERPL-MCNC: 4.5 G/DL (ref 3.5–5.2)
ALBUMIN/GLOB SERPL: 2 G/DL
ALP SERPL-CCNC: 85 U/L (ref 39–117)
ALT SERPL-CCNC: 53 U/L (ref 1–41)
AST SERPL-CCNC: 32 U/L (ref 1–40)
BASOPHILS # BLD AUTO: 0.05 10*3/MM3 (ref 0–0.2)
BASOPHILS NFR BLD AUTO: 0.8 % (ref 0–1.5)
BILIRUB SERPL-MCNC: 0.8 MG/DL (ref 0–1.2)
BUN SERPL-MCNC: 14 MG/DL (ref 8–23)
BUN/CREAT SERPL: 13.6 (ref 7–25)
CALCIUM SERPL-MCNC: 9.5 MG/DL (ref 8.6–10.5)
CHLORIDE SERPL-SCNC: 105 MMOL/L (ref 98–107)
CO2 SERPL-SCNC: 22.4 MMOL/L (ref 22–29)
CREAT SERPL-MCNC: 1.03 MG/DL (ref 0.76–1.27)
EGFRCR SERPLBLD CKD-EPI 2021: 82.6 ML/MIN/1.73
EOSINOPHIL # BLD AUTO: 0.21 10*3/MM3 (ref 0–0.4)
EOSINOPHIL NFR BLD AUTO: 3.5 % (ref 0.3–6.2)
ERYTHROCYTE [DISTWIDTH] IN BLOOD BY AUTOMATED COUNT: 13.4 % (ref 12.3–15.4)
GLOBULIN SER CALC-MCNC: 2.2 GM/DL
GLUCOSE SERPL-MCNC: 81 MG/DL (ref 65–99)
HCT VFR BLD AUTO: 47.2 % (ref 37.5–51)
HGB BLD-MCNC: 15.1 G/DL (ref 13–17.7)
IMM GRANULOCYTES # BLD AUTO: 0.01 10*3/MM3 (ref 0–0.05)
IMM GRANULOCYTES NFR BLD AUTO: 0.2 % (ref 0–0.5)
LYMPHOCYTES # BLD AUTO: 1.74 10*3/MM3 (ref 0.7–3.1)
LYMPHOCYTES NFR BLD AUTO: 29.1 % (ref 19.6–45.3)
MCH RBC QN AUTO: 28.9 PG (ref 26.6–33)
MCHC RBC AUTO-ENTMCNC: 32 G/DL (ref 31.5–35.7)
MCV RBC AUTO: 90.4 FL (ref 79–97)
MONOCYTES # BLD AUTO: 0.56 10*3/MM3 (ref 0.1–0.9)
MONOCYTES NFR BLD AUTO: 9.4 % (ref 5–12)
NEUTROPHILS # BLD AUTO: 3.4 10*3/MM3 (ref 1.7–7)
NEUTROPHILS NFR BLD AUTO: 57 % (ref 42.7–76)
NRBC BLD AUTO-RTO: 0 /100 WBC (ref 0–0.2)
PLATELET # BLD AUTO: 205 10*3/MM3 (ref 140–450)
POTASSIUM SERPL-SCNC: 3.9 MMOL/L (ref 3.5–5.2)
PROT SERPL-MCNC: 6.7 G/DL (ref 6–8.5)
PSA SERPL-MCNC: 7.44 NG/ML (ref 0–4)
RBC # BLD AUTO: 5.22 10*6/MM3 (ref 4.14–5.8)
SODIUM SERPL-SCNC: 140 MMOL/L (ref 136–145)
WBC # BLD AUTO: 5.97 10*3/MM3 (ref 3.4–10.8)